# Patient Record
(demographics unavailable — no encounter records)

---

## 2025-04-02 NOTE — CARDIOLOGY SUMMARY
[de-identified] : 4/2/25 -sinus bradycardia at a rate of 56 bpm.  Nonspecific diminished voltage in lead V6.

## 2025-04-02 NOTE — HISTORY OF PRESENT ILLNESS
[FreeTextEntry1] : Mr. Noel Pacheco presented to the office today for initial cardiology consultation.  He is self-referred, and presents for further evaluation of recent palpitations.  He has been under the medical care of Dr. Calos Diaz.  The patient is a 66-year-old male  who does not have a known cardiac history.  He has a history of anterior cervical spinal discectomy, surgeries for bilateral ear canal exostoses, and surgery for left de Quervain's tendinitis.  A few weeks ago, the patient began experiencing randomly-occurring palpitations, described as momentary "flutters" in the right parasternal region.  Initially, these palpitations occurred most days of the week, a few times per day.  Over the past 2 weeks, the patient has noted diminished frequency of these palpitations, to the point where they are now occurring just a few days per week and just a few times per day.  He has not experienced any sustained palpitations or a sensation of tachycardia.  The palpitations have not been associated with lightheadedness, presyncope or syncope.  He has not noted any appreciable change in the palpitations in association with his physical activities.  He does point out that a few weeks ago, when he first began noticing the palpitations, that he was under "tremendous" stress, and the palpitations have seemingly diminished as his stress has diminished over the past few weeks.  The patient recalls having experienced similar palpitations for a brief period of time many years ago.  He has not experienced recurrence of palpitations until a few weeks ago.  The patient does not report having experienced chest discomfort or dyspnea on exertion in association with his activities, which include hiking, golfing, and weightlifting.  He has not noted orthopnea, paroxysmal nocturnal dyspnea, or lower extremity edema.  He has not experienced any episodes of presyncope or syncope.  As far as risk factors for coronary artery disease are concerned, the patient denies having a history of hypertension, a dyslipidemia, or diabetes.  He discontinued cigarette smoking at age 55, having previously smoked half a pack per day for 40 years.  He does not have a known immediate family history of premature coronary artery disease.

## 2025-04-02 NOTE — PHYSICAL EXAM
[Well Developed] : well developed [No Acute Distress] : no acute distress [Normal Conjunctiva] : normal conjunctiva [No Carotid Bruit] : no carotid bruit [Normal S1, S2] : normal S1, S2 [No Murmur] : no murmur [No Rub] : no rub [No Gallop] : no gallop [Clear Lung Fields] : clear lung fields [No Respiratory Distress] : no respiratory distress  [Soft] : abdomen soft [Non Tender] : non-tender [Normal Gait] : normal gait [No Edema] : no edema [No Rash] : no rash [Moves all extremities] : moves all extremities [Alert and Oriented] : alert and oriented [de-identified] : White male [de-identified] : No JVD is appreciated at a 45 degree angle